# Patient Record
Sex: FEMALE | Race: OTHER | NOT HISPANIC OR LATINO | URBAN - METROPOLITAN AREA
[De-identification: names, ages, dates, MRNs, and addresses within clinical notes are randomized per-mention and may not be internally consistent; named-entity substitution may affect disease eponyms.]

---

## 2022-09-15 ENCOUNTER — EMERGENCY (EMERGENCY)
Facility: HOSPITAL | Age: 87
LOS: 1 days | Discharge: HOME | End: 2022-09-15
Attending: EMERGENCY MEDICINE | Admitting: EMERGENCY MEDICINE

## 2022-09-15 VITALS
RESPIRATION RATE: 16 BRPM | OXYGEN SATURATION: 98 % | SYSTOLIC BLOOD PRESSURE: 194 MMHG | DIASTOLIC BLOOD PRESSURE: 79 MMHG | HEART RATE: 73 BPM | TEMPERATURE: 98 F

## 2022-09-15 PROCEDURE — 99284 EMERGENCY DEPT VISIT MOD MDM: CPT | Mod: 25

## 2022-09-15 PROCEDURE — 73130 X-RAY EXAM OF HAND: CPT | Mod: 26,RT

## 2022-09-15 PROCEDURE — 12002 RPR S/N/AX/GEN/TRNK2.6-7.5CM: CPT

## 2022-09-15 RX ORDER — ACETAMINOPHEN 500 MG
975 TABLET ORAL ONCE
Refills: 0 | Status: COMPLETED | OUTPATIENT
Start: 2022-09-15 | End: 2022-09-15

## 2022-09-15 RX ORDER — TETANUS TOXOID, REDUCED DIPHTHERIA TOXOID AND ACELLULAR PERTUSSIS VACCINE, ADSORBED 5; 2.5; 8; 8; 2.5 [IU]/.5ML; [IU]/.5ML; UG/.5ML; UG/.5ML; UG/.5ML
0.5 SUSPENSION INTRAMUSCULAR ONCE
Refills: 0 | Status: COMPLETED | OUTPATIENT
Start: 2022-09-15 | End: 2022-09-15

## 2022-09-15 RX ADMIN — TETANUS TOXOID, REDUCED DIPHTHERIA TOXOID AND ACELLULAR PERTUSSIS VACCINE, ADSORBED 0.5 MILLILITER(S): 5; 2.5; 8; 8; 2.5 SUSPENSION INTRAMUSCULAR at 17:24

## 2022-09-15 RX ADMIN — Medication 975 MILLIGRAM(S): at 17:23

## 2022-09-15 NOTE — ED PROVIDER NOTE - CARE PROVIDER_API CALL
Carmelo Cohen (MD)  Plastic Surgery  [address] [place] [address], [address]  [place], [place] [id number]  Phone: [phone]  Fax: [phone]  Follow Up Time: 1-3 Days

## 2022-09-15 NOTE — ED PROVIDER NOTE - PROGRESS NOTE DETAILS
TIFF Solo NP:  Spoke w/ Dr. Cohen who recommends simple interrupted closure w/ nylon sutures, one layer, starting pt on augmentin x 5 days, and pt can f/u with him in office tomorrow.  Imaging results and plan d/w patient and her daughter.  All questions answered.  Pt verbally consents to sutures. TIFF Solo NP:  Spoke w/ Dr. oChen who recommends simple interrupted closure w/ nylon sutures, one layer, starting pt on augmentin x 5 days, and pt can f/u with him in office tomorrow.  Imaging results and plan d/w patient and her daughter.  All questions answered. TIFF Solo NP:  Pt tolerated procedure well. no complications.  abx sent to pharmacy as per pt. pt stable and ready for d/c.

## 2022-09-15 NOTE — ED PROVIDER NOTE - CARE PLAN
Principal Discharge DX:	Laceration of right palm   1 Principal Discharge DX:	Laceration of left palm

## 2022-09-15 NOTE — ED ADULT TRIAGE NOTE - CHIEF COMPLAINT QUOTE
Pt presents to ED ambulatory from home with c/o Laceration to R hand s/p trip and fall. Pt reports she was walking when she tripped on uneven pavement causing her to fall forward. Pt denies head strike, LOC or anticoagulant use. Pt arrives with bleeding controlled.

## 2022-09-15 NOTE — ED PROVIDER NOTE - OBJECTIVE STATEMENT
87 y.o. female w/ no sig PMHx presents to the ED following a fall with a deep laceration to her R. palm.  Pt states she was walking outdoors when she tripped on an uneven curb and fell, landing entirely on R. hand.  No other scrapes or abrasions on any other body part.  Denies any head injury.  Reports pain and bleeding following the injury but since then bleeding has stabilized. Detail Level: Detailed Note Text (......Xxx Chief Complaint.): This diagnosis correlates with the Other (Free Text): no changes from last visit. referenced mole map from last visit. 87 y.o. female w/ no sig PMHx presents to the ED following a fall with a deep laceration to her L. palm.  Pt states she was walking outdoors when she tripped on an uneven curb and fell, landing entirely on L. hand.  No other scrapes or abrasions on any other body part.  Denies any head injury.  Reports pain and bleeding following the injury but since then bleeding has stabilized.

## 2022-09-15 NOTE — ED PROCEDURE NOTE - CPROC ED POST PROC CARE GUIDE1
Follow up next day with Hand Surgeon/Plastics, Dr. Cohen/Verbal/written post procedure instructions were given to patient/caregiver./Instructed patient/caregiver regarding signs and symptoms of infection./Keep the cast/splint/dressing clean and dry.

## 2022-09-15 NOTE — ED PROVIDER NOTE - ATTENDING APP SHARED VISIT CONTRIBUTION OF CARE
Seen and examined, mechanical fall w direct blow to R palm, hypothenar eminence, laceration to muscle/fascia, no c/o weakness/numbness, no other injury, denies head injury or LOC, no use of blood thinners. No HA, no neck pain, no lower ext. c/o, ambulates easily to ED. Unsure of last tetanus, had primary series as child. RUE NT shoulder/elbow/forearm. No bony tenderness at wrist, pain with wrist movements, ecchymoses to ulnar palm w 5cm jagged laceration, mild oozing. Full ROM all digits, flexors tested individually, full FDP/FDS, normal sensory to exam, good cap refill all digits. Seen and examined, mechanical fall w direct blow to L palm, hypothenar eminence, laceration to muscle/fascia, no c/o weakness/numbness, no other injury, denies head injury or LOC, no use of blood thinners. No HA, no neck pain, no lower ext. c/o, ambulates easily to ED. Unsure of last tetanus, had primary series as child. RUE NT shoulder/elbow/forearm. No bony tenderness at wrist, pain with wrist movements, ecchymoses to ulnar palm w 5cm jagged laceration, mild oozing. Full ROM all digits, flexors tested individually, full FDP/FDS, normal sensory to exam, good cap refill all digits.

## 2022-09-15 NOTE — ED PROVIDER NOTE - PATIENT PORTAL LINK FT
You can access the FollowMyHealth Patient Portal offered by [facility] by registering at the following website: [email or web address]. By joining [facility]’s FollowMyHealth portal, you will also be able to view your health information using other applications (apps) compatible with our system.

## 2022-09-15 NOTE — ED ADULT NURSE NOTE - OBJECTIVE STATEMENT
received pt in intake room 15, 87 yr/o female A+OX4, ambulatory at baseline. pt presented to the ED S/P fall C/O laceration to the left palm. pt denies having hit her head, LOC, or being on anticoagulants. laceration is a [id number] wound adipose tissue visible. pt received meds as ordered. will continue to monitor.

## 2022-09-15 NOTE — ED PROVIDER NOTE - NS ED ATTENDING STATEMENT MOD
This was a shared visit with the MARU. I reviewed and verified the documentation and independently performed the documented:

## 2022-09-15 NOTE — ED PROVIDER NOTE - PHYSICAL EXAMINATION
GEN:  Pt NAD, A&O x 4  EYES: Sclera white w/o injection.   ENT: Head NCAT. MMM. Neck supple FROM.  RESP: CTA b/l, no wheezes, rales, or rhonchi.   CARDIAC: RRR, clear distinct S1, S2, no appreciable murmurs.  ABD: Abdomen soft, non-tender. No CVAT b/l.  VASC: 2+ radial and dorsalis pedis pulses b/l. No edema or calf tenderness.  SKIN: +laceration on camarena surface below 5th digit. GEN:  Pt NAD, A&O x 4  EYES: Sclera white w/o injection.   ENT: Head NCAT. MMM. Neck supple FROM.  RESP: CTA b/l, no wheezes, rales, or rhonchi.   CARDIAC: RRR, clear distinct S1, S2, no appreciable murmurs.  ABD: Abdomen soft, non-tender. No CVAT b/l.  VASC: 2+ radial and dorsalis pedis pulses b/l. No edema or calf tenderness.  SKIN: +laceration on L. camarena surface below 5th digit.

## 2022-09-15 NOTE — ED PROVIDER NOTE - CLINICAL SUMMARY MEDICAL DECISION MAKING FREE TEXT BOX
87 y.o. female w/ no sig PMHx presenting w/ R. palmar laceration following a fall.  Given mechanism of injury and depth of lac, will obtain xray of the hand to eval for fracture and retain foreign body.  If neg w/ cleanse, give tdap, and consult hand surgery for full closure vs. simple closure and f/u with hand sx. 87 y.o. female w/ no sig PMHx presenting w/ L. palmar laceration following a fall.  Given mechanism of injury and depth of lac, will obtain xray of the hand to eval for fracture and retain foreign body.  If neg w/ cleanse, give tdap, and consult hand surgery for full closure vs. simple closure and f/u with hand sx.

## 2022-09-15 NOTE — ED PROVIDER NOTE - NSFOLLOWUPINSTRUCTIONS_ED_ALL_ED_FT
FOLLOW ALL INSTRUCTIONS GIVEN YOU ABOUT THE CARE OF YOUR LACERATION    RETURN FOR SUTURE REMOVAL IN      DAYS    Laceration WHAT YOU NEED TO KNOW:    A laceration is an injury to the skin and the soft tissue underneath it. Lacerations can happen anywhere on the body.    DISCHARGE INSTRUCTIONS:    Seek care immediately if:   •You have heavy bleeding or bleeding that does not stop after 10 minutes of holding firm, direct pressure over the wound.      •Your wound opens up.      Call your doctor if:   •You have a fever or chills.      •Your laceration is red, warm, or swollen.      •You have red streaks on your skin coming from your wound.      •You have white or yellow drainage from the wound that smells bad.      •You have pain that gets worse, even after treatment.      •You have questions or concerns about your condition or care.      Medicines: You may need any of the following:   •Prescription pain medicine may be given. Ask your healthcare provider how to take this medicine safely. Some prescription pain medicines contain acetaminophen. Do not take other medicines that contain acetaminophen without talking to your healthcare provider. Too much acetaminophen may cause liver damage. Prescription pain medicine may cause constipation. Ask your healthcare provider how to prevent or treat constipation.       •Antibiotics help treat or prevent a bacterial infection.      •Take your medicine as directed. Contact your healthcare provider if you think your medicine is not helping or if you have side effects. Tell him or her if you are allergic to any medicine. Keep a list of the medicines, vitamins, and herbs you take. Include the amounts, and when and why you take them. Bring the list or the pill bottles to follow-up visits. Carry your medicine list with you in case of an emergency.      Care for your wound as directed:   •Do not get your wound wet until your healthcare provider says it is okay. Do not soak your wound in water. Do not go swimming until your healthcare provider says it is okay. Carefully wash the wound with soap and water. Gently pat the area dry or allow it to air dry.      •Change your bandages when they get wet, dirty, or after washing. Apply new, clean bandages as directed. Do not apply elastic bandages or tape too tight. Do not put powders or lotions over your incision.      •Apply antibiotic ointment as directed. Your healthcare provider may give you antibiotic ointment to put over your wound if you have stitches. If you have strips of tape over your incision, let them dry up and fall off on their own. If they do not fall off within 14 days, gently remove them. If you have glue over your wound, do not remove or pick at it. If your glue comes off, do not replace it with glue that you have at home.        •Check your wound every day for signs of infection, such as swelling, redness, or pus.      Self-care:   •Apply ice on your wound for 15 to 20 minutes every hour or as directed. Use an ice pack, or put crushed ice in a plastic bag. Cover it with a towel. Ice helps prevent tissue damage and decreases swelling and pain.      •Use a splint as directed. A splint will decrease movement and stress on your wound. It may help it heal faster. A splint may be used for lacerations over joints or areas of your body that bend. Ask your healthcare provider how to apply and remove a splint.      •Decrease scarring of your wound by applying ointments as directed. Do not apply ointments until your healthcare provider says it is okay. You may need to wait until your wound is healed. Ask which ointment to buy and how often to use it. After your wound is healed, use sunscreen over the area when you are out in the sun. You should do this for at least 6 months to 1 year after your injury.      Follow up with your doctor as directed: You will need to return in 3 to 14 days to have stitches or staples removed. Write down your questions so you remember to ask them during your visits.

## 2022-09-15 NOTE — ED PROCEDURE NOTE - PROCEDURE ADDITIONAL DETAILS
Per Dr. Cohen, simple interrupted sutures placed just to align flaps as pt to f/u with him in office TOMORROW. Pt agreed and complications of not doing so d/w and her daughter in detail.

## 2023-01-12 NOTE — ED ADULT TRIAGE NOTE - MEANS OF ARRIVAL
Is This A New Presentation, Or A Follow-Up?: Wart Additional History: Treated with Canthacur x3 hours and cryosurgery at last office visit ambulatory